# Patient Record
Sex: FEMALE | Race: WHITE | HISPANIC OR LATINO | URBAN - METROPOLITAN AREA
[De-identification: names, ages, dates, MRNs, and addresses within clinical notes are randomized per-mention and may not be internally consistent; named-entity substitution may affect disease eponyms.]

---

## 2023-12-18 ENCOUNTER — HOSPITAL ENCOUNTER (EMERGENCY)
Facility: HOSPITAL | Age: 48
Discharge: HOME | End: 2023-12-18
Attending: STUDENT IN AN ORGANIZED HEALTH CARE EDUCATION/TRAINING PROGRAM
Payer: MEDICAID

## 2023-12-18 VITALS
TEMPERATURE: 99 F | DIASTOLIC BLOOD PRESSURE: 96 MMHG | BODY MASS INDEX: 32.78 KG/M2 | HEART RATE: 82 BPM | SYSTOLIC BLOOD PRESSURE: 171 MMHG | WEIGHT: 185 LBS | HEIGHT: 63 IN | OXYGEN SATURATION: 97 % | RESPIRATION RATE: 18 BRPM

## 2023-12-18 DIAGNOSIS — B08.4 HAND, FOOT AND MOUTH DISEASE: Primary | ICD-10-CM

## 2023-12-18 LAB
GROUP A STREP, MOLECULAR: NEGATIVE
INFLUENZA A, MOLECULAR: NEGATIVE
INFLUENZA B, MOLECULAR: NEGATIVE
SARS-COV-2 RDRP RESP QL NAA+PROBE: NEGATIVE
SPECIMEN SOURCE: NORMAL

## 2023-12-18 PROCEDURE — 99282 EMERGENCY DEPT VISIT SF MDM: CPT | Mod: ER

## 2023-12-18 PROCEDURE — 25000003 PHARM REV CODE 250: Mod: ER | Performed by: STUDENT IN AN ORGANIZED HEALTH CARE EDUCATION/TRAINING PROGRAM

## 2023-12-18 PROCEDURE — U0002 COVID-19 LAB TEST NON-CDC: HCPCS | Mod: ER | Performed by: STUDENT IN AN ORGANIZED HEALTH CARE EDUCATION/TRAINING PROGRAM

## 2023-12-18 PROCEDURE — 87651 STREP A DNA AMP PROBE: CPT | Mod: ER | Performed by: STUDENT IN AN ORGANIZED HEALTH CARE EDUCATION/TRAINING PROGRAM

## 2023-12-18 PROCEDURE — 87502 INFLUENZA DNA AMP PROBE: CPT | Mod: ER | Performed by: STUDENT IN AN ORGANIZED HEALTH CARE EDUCATION/TRAINING PROGRAM

## 2023-12-18 RX ORDER — DIPHENHYDRAMINE HCL 12.5MG/5ML
25 ELIXIR ORAL
Status: COMPLETED | OUTPATIENT
Start: 2023-12-18 | End: 2023-12-18

## 2023-12-18 RX ORDER — LISINOPRIL 10 MG/1
10 TABLET ORAL DAILY
COMMUNITY

## 2023-12-18 RX ORDER — MAG HYDROX/ALUMINUM HYD/SIMETH 200-200-20
5 SUSPENSION, ORAL (FINAL DOSE FORM) ORAL
Status: COMPLETED | OUTPATIENT
Start: 2023-12-18 | End: 2023-12-18

## 2023-12-18 RX ADMIN — DIPHENHYDRAMINE HYDROCHLORIDE 25 MG: 12.5 SOLUTION ORAL at 02:12

## 2023-12-18 RX ADMIN — ALUMINUM HYDROXIDE, MAGNESIUM HYDROXIDE, AND SIMETHICONE 5 ML: 200; 200; 20 SUSPENSION ORAL at 02:12

## 2023-12-18 NOTE — Clinical Note
"Berenice Liumariel Claros was seen and treated in our emergency department on 12/18/2023.  She may return to work on 12/21/2023.       If you have any questions or concerns, please don't hesitate to call.      Clifford Tabor MD"

## 2023-12-20 NOTE — ED PROVIDER NOTES
"ED Provider Note - 12/18/2023    History     Chief Complaint   Patient presents with    Sore Throat     Reports to ED c c/o sore throat and ear pain x 2 days. Denies cough or SOB       HPI     Berenice Claros is a 48 y.o. year old female with past medical and surgical history as seen below, presenting with chief complaint of sore throat.  Associated with rash including the palms soles of feet.  No tropical travel.  No insect or tick exposure.      History reviewed. No pertinent past medical history.  No past surgical history on file.      History reviewed. No pertinent family history.     Social Determinants of Health with Concerns     Tobacco Use: Not on file   Alcohol Use: Not on file   Financial Resource Strain: Not on file   Food Insecurity: Not on file   Transportation Needs: Not on file   Physical Activity: Not on file   Stress: Not on file   Social Connections: Not on file   Housing Stability: Not on file   Depression: Not on file      Review of patient's allergies indicates:   Allergen Reactions    Penicillins Hives       Review of Systems     A full Review of Systems (ROS) was performed and was negative unless otherwise stated in the HPI.      Physical Exam     Vitals:    12/18/23 0136 12/18/23 0151   BP: (!) 171/96    Pulse: 82    Resp: 18    Temp: 98.8 °F (37.1 °C)    TempSrc: Oral    SpO2: 97%    Weight: 83.9 kg (185 lb)    Height:  5' 3" (1.6 m)        Physical Exam    Nursing note and vitals reviewed.  Constitutional: She appears well-developed and well-nourished.   HENT:   Head: Normocephalic and atraumatic.   Right Ear: External ear normal.   Left Ear: External ear normal.   Nose: Nose normal.   Eyes: Conjunctivae and EOM are normal. Pupils are equal, round, and reactive to light.   Neck: No tracheal deviation present.   Normal range of motion.  Cardiovascular:  Normal rate and regular rhythm.           Pulmonary/Chest: No respiratory distress. She has no wheezes.   Musculoskeletal:         " General: No edema. Normal range of motion.      Cervical back: Normal range of motion.     Neurological: She is alert and oriented to person, place, and time. She has normal strength. No cranial nerve deficit or sensory deficit. Gait normal.   Skin: Skin is warm and dry.   Rash including palms and soles of feet most consistent with hand-foot-mouth   Psychiatric: She has a normal mood and affect. Her behavior is normal. Thought content normal.         Lab Results- Independently reviewed by myself      Labs Reviewed   GROUP A STREP, MOLECULAR   INFLUENZA A & B BY MOLECULAR   SARS-COV-2 RNA AMPLIFICATION, QUAL    Narrative:     Is the patient symptomatic?->Yes           Imaging     Imaging Results    None                    ED Course         Procedures         Orders Placed This Encounter    Group A Strep, Molecular    Influenza A & B by Molecular    COVID-19 Rapid Screening    Ambulatory referral/consult to Family Practice    diphenhydrAMINE 12.5 mg/5 mL elixir 25 mg    aluminum-magnesium hydroxide-simethicone 200-200-20 mg/5 mL suspension 5 mL    (Magic mouthwash) 1:1 Benadryl 12.5mg/5ml liq, mylanta, LIDOcaine viscous 2%, nystatin 100,000u, prednisolone 15mg/5mL, distilled water                      Medical Decision Making       The patient's list of active medical problems, social history, medications, and allergies as documented per RN staff has been reviewed.           Medical Decision Making  48-year-old female with symptoms most consistent with hand-foot-mouth disease.  Rash does not have appearance consistent with other tick or mosquito borne illness.  No other concerning exposures.  Differential includes stella mountain spotted fever, ehrlichiosis, viral exanthem, secondary syphilis, but these seem less likely than hand-foot-mouth given history and exam.  Patient appears stable for discharge and outpatient follow up.    Amount and/or Complexity of Data Reviewed  Labs: ordered.     Details: Strep:  Negative    COVID:  Negative   Influenza A and B:  Negative      Risk  OTC drugs.                Discharge Medication List as of 12/18/2023  2:25 AM             ED Prescriptions       Medication Sig Dispense Start Date End Date Auth. Provider    (Magic mouthwash) 1:1 Benadryl 12.5mg/5ml liq, mylanta, LIDOcaine viscous 2%, nystatin 100,000u, prednisolone 15mg/5mL, distilled water Swish and swallow 15 mLs every 4 (four) hours as needed (mouth pain). 480 mL 12/18/2023 -- Clifford Tabor MD              Clinical Impression       Follow-up Information       Follow up With Specialties Details Why Contact Info    Primary care provider of your choice  Schedule an appointment as soon as possible for a visit in 3 days For follow-up on today's visit.     [facility] - Emergency Dept Emergency Medicine Go to  As needed, If symptoms worsen [address] [facility] [id number] [phone]            Referrals:  Orders Placed This Encounter   Procedures    Ambulatory referral/consult to Family Practice     Standing Status:   Future     Standing Expiration Date:   1/18/2025     Referral Priority:   Routine     Referral Type:   Consultation     Referral Reason:   Specialty Services Required     Requested Specialty:   Family Medicine     Number of Visits Requested:   1       Disposition   ED Disposition Condition    Discharge Stable            Diagnosis    ICD-10-CM ICD-9-CM   1. Hand, foot and mouth disease  B08.4 074.3           Clifford Tabor MD        12/20/2023          DISCLAIMER: This note was prepared with [employer] voice recognition transcription software. Garbled syntax, mangled pronouns, and other bizarre constructions may be attributed to that software system.       Clifford Tabor MD  12/20/23 0346

## 2024-05-28 ENCOUNTER — HOSPITAL ENCOUNTER (EMERGENCY)
Facility: HOSPITAL | Age: 49
Discharge: HOME | End: 2024-05-28
Attending: EMERGENCY MEDICINE
Payer: MEDICAID

## 2024-05-28 VITALS
HEART RATE: 71 BPM | OXYGEN SATURATION: 95 % | TEMPERATURE: 98 F | WEIGHT: 212 LBS | HEIGHT: 64 IN | SYSTOLIC BLOOD PRESSURE: 190 MMHG | BODY MASS INDEX: 36.19 KG/M2 | RESPIRATION RATE: 16 BRPM | DIASTOLIC BLOOD PRESSURE: 115 MMHG

## 2024-05-28 DIAGNOSIS — R07.9 CHEST PAIN, UNSPECIFIED TYPE: ICD-10-CM

## 2024-05-28 DIAGNOSIS — R00.2 PALPITATIONS: Primary | ICD-10-CM

## 2024-05-28 LAB
ALBUMIN SERPL BCP-MCNC: 4.7 G/DL (ref 3.5–5.2)
ALP SERPL-CCNC: 99 U/L (ref 38–126)
ALT SERPL W/O P-5'-P-CCNC: 23 U/L (ref 10–44)
ANION GAP SERPL CALC-SCNC: 12 MMOL/L (ref 8–16)
AST SERPL-CCNC: 23 U/L (ref 15–46)
BASOPHILS # BLD AUTO: 0.06 K/UL (ref 0–0.2)
BASOPHILS NFR BLD: 0.7 % (ref 0–1.9)
BILIRUB SERPL-MCNC: 0.7 MG/DL (ref 0.1–1)
CALCIUM SERPL-MCNC: 9.1 MG/DL (ref 8.7–10.5)
CHLORIDE SERPL-SCNC: 102 MMOL/L (ref 95–110)
CO2 SERPL-SCNC: 24 MMOL/L (ref 23–29)
CREAT SERPL-MCNC: 0.38 MG/DL (ref 0.5–1.4)
DIFFERENTIAL METHOD BLD: ABNORMAL
EOSINOPHIL # BLD AUTO: 0.1 K/UL (ref 0–0.5)
EOSINOPHIL NFR BLD: 0.8 % (ref 0–8)
ERYTHROCYTE [DISTWIDTH] IN BLOOD BY AUTOMATED COUNT: 12 % (ref 11.5–14.5)
EST. GFR  (NO RACE VARIABLE): >60 ML/MIN/1.73 M^2
GLUCOSE SERPL-MCNC: 113 MG/DL (ref 70–110)
HCT VFR BLD AUTO: 40.6 % (ref 37–48.5)
HGB BLD-MCNC: 14.4 G/DL (ref 12–16)
IMM GRANULOCYTES # BLD AUTO: 0.04 K/UL (ref 0–0.04)
IMM GRANULOCYTES NFR BLD AUTO: 0.4 % (ref 0–0.5)
LYMPHOCYTES # BLD AUTO: 3.1 K/UL (ref 1–4.8)
LYMPHOCYTES NFR BLD: 34.4 % (ref 18–48)
MAGNESIUM SERPL-MCNC: 1.8 MG/DL (ref 1.6–2.6)
MCH RBC QN AUTO: 32 PG (ref 27–31)
MCHC RBC AUTO-ENTMCNC: 35.5 G/DL (ref 32–36)
MCV RBC AUTO: 90 FL (ref 82–98)
MONOCYTES # BLD AUTO: 0.6 K/UL (ref 0.3–1)
MONOCYTES NFR BLD: 6.7 % (ref 4–15)
NEUTROPHILS # BLD AUTO: 5.1 K/UL (ref 1.8–7.7)
NEUTROPHILS NFR BLD: 57 % (ref 38–73)
NRBC BLD-RTO: 0 /100 WBC
NT-PROBNP SERPL-MCNC: <20 PG/ML (ref 5–450)
PLATELET # BLD AUTO: 328 K/UL (ref 150–450)
PMV BLD AUTO: 10.2 FL (ref 9.2–12.9)
POTASSIUM SERPL-SCNC: 3.3 MMOL/L (ref 3.5–5.1)
PROT SERPL-MCNC: 7.7 G/DL (ref 6–8.4)
RBC # BLD AUTO: 4.5 M/UL (ref 4–5.4)
SODIUM SERPL-SCNC: 138 MMOL/L (ref 136–145)
TROPONIN I SERPL-MCNC: <0.012 NG/ML (ref 0.01–0.03)
TROPONIN I SERPL-MCNC: <0.012 NG/ML (ref 0.01–0.03)
UUN UR-MCNC: 5 MG/DL (ref 7–17)
WBC # BLD AUTO: 8.96 K/UL (ref 3.9–12.7)

## 2024-05-28 PROCEDURE — 83735 ASSAY OF MAGNESIUM: CPT | Mod: ER | Performed by: EMERGENCY MEDICINE

## 2024-05-28 PROCEDURE — 99284 EMERGENCY DEPT VISIT MOD MDM: CPT | Mod: 25,ER

## 2024-05-28 PROCEDURE — 25000003 PHARM REV CODE 250: Mod: ER | Performed by: EMERGENCY MEDICINE

## 2024-05-28 PROCEDURE — 84484 ASSAY OF TROPONIN QUANT: CPT | Mod: 91,ER | Performed by: EMERGENCY MEDICINE

## 2024-05-28 PROCEDURE — 25500020 PHARM REV CODE 255: Mod: ER | Performed by: EMERGENCY MEDICINE

## 2024-05-28 PROCEDURE — 99900035 HC TECH TIME PER 15 MIN (STAT): Mod: ER

## 2024-05-28 PROCEDURE — 63600175 PHARM REV CODE 636 W HCPCS: Mod: ER | Performed by: EMERGENCY MEDICINE

## 2024-05-28 PROCEDURE — 96361 HYDRATE IV INFUSION ADD-ON: CPT | Mod: ER

## 2024-05-28 PROCEDURE — 85025 COMPLETE CBC W/AUTO DIFF WBC: CPT | Mod: ER | Performed by: EMERGENCY MEDICINE

## 2024-05-28 PROCEDURE — 80053 COMPREHEN METABOLIC PANEL: CPT | Mod: ER | Performed by: EMERGENCY MEDICINE

## 2024-05-28 PROCEDURE — 93010 ELECTROCARDIOGRAM REPORT: CPT | Mod: ,,, | Performed by: INTERNAL MEDICINE

## 2024-05-28 PROCEDURE — 93005 ELECTROCARDIOGRAM TRACING: CPT | Mod: ER

## 2024-05-28 PROCEDURE — 83880 ASSAY OF NATRIURETIC PEPTIDE: CPT | Mod: ER | Performed by: EMERGENCY MEDICINE

## 2024-05-28 PROCEDURE — 96375 TX/PRO/DX INJ NEW DRUG ADDON: CPT | Mod: 59,ER

## 2024-05-28 PROCEDURE — 96374 THER/PROPH/DIAG INJ IV PUSH: CPT | Mod: 59,ER

## 2024-05-28 RX ORDER — ASPIRIN 325 MG
325 TABLET ORAL
Status: COMPLETED | OUTPATIENT
Start: 2024-05-28 | End: 2024-05-28

## 2024-05-28 RX ORDER — FAMOTIDINE 20 MG/1
20 TABLET, FILM COATED ORAL 2 TIMES DAILY
Qty: 20 TABLET | Refills: 0 | Status: SHIPPED | OUTPATIENT
Start: 2024-05-28 | End: 2025-05-28

## 2024-05-28 RX ORDER — KETOROLAC TROMETHAMINE 30 MG/ML
15 INJECTION, SOLUTION INTRAMUSCULAR; INTRAVENOUS
Status: COMPLETED | OUTPATIENT
Start: 2024-05-28 | End: 2024-05-28

## 2024-05-28 RX ORDER — PROCHLORPERAZINE EDISYLATE 5 MG/ML
10 INJECTION INTRAMUSCULAR; INTRAVENOUS
Status: COMPLETED | OUTPATIENT
Start: 2024-05-28 | End: 2024-05-28

## 2024-05-28 RX ADMIN — KETOROLAC TROMETHAMINE 15 MG: 30 INJECTION, SOLUTION INTRAMUSCULAR at 04:05

## 2024-05-28 RX ADMIN — SODIUM CHLORIDE 500 ML: 9 INJECTION, SOLUTION INTRAVENOUS at 02:05

## 2024-05-28 RX ADMIN — ASPIRIN 325 MG ORAL TABLET 325 MG: 325 PILL ORAL at 01:05

## 2024-05-28 RX ADMIN — POTASSIUM BICARBONATE 50 MEQ: 978 TABLET, EFFERVESCENT ORAL at 05:05

## 2024-05-28 RX ADMIN — IOHEXOL 100 ML: 350 INJECTION, SOLUTION INTRAVENOUS at 02:05

## 2024-05-28 RX ADMIN — PROCHLORPERAZINE EDISYLATE 10 MG: 5 INJECTION INTRAMUSCULAR; INTRAVENOUS at 04:05

## 2024-05-28 NOTE — ED PROVIDER NOTES
Encounter Date: 5/28/2024       History     Chief Complaint   Patient presents with    Palpitations     PT just got back from cruise and while on cruise, she was experiencing palpitations and elevated heart rate. Pt went see PCP today and was told to come to ER due to abnormal findings on EKG and high blood pressure. PT reports she does have tightness in chest, neck and back. Pt also reports bilateral leg swelling       49-year-old female referred to the emergency department by PCP for evaluation of palpitations, chest pressure.  Patient just recently got off a cruise.  She was feeling some palpitations during the cruise but had an EKG that was apparently reassuring on the cruise.  She called her primary care doctor and had an appointment today.  When she describes her symptoms she was referred to the ER.  Notes some transient shortness of breath while on the cruise but none since.  Notes some chest pressure.  No exertional component noted.  Denies any nausea or vomiting.  Notes some leg swelling as well.  Leg swelling is symmetric.  Denies any cough, congestion, fever.  No other symptoms reported at this time.      Review of patient's allergies indicates:   Allergen Reactions    Penicillins Hives     Past Medical History:   Diagnosis Date    Hypertension      Past Surgical History:   Procedure Laterality Date    HERNIA REPAIR      HYSTERECTOMY       No family history on file.  Social History     Tobacco Use    Smoking status: Never    Smokeless tobacco: Never   Substance Use Topics    Alcohol use: Yes     Comment: occ    Drug use: Yes     Types: Marijuana     Review of Systems   Constitutional:  Negative for chills and fever.   HENT:  Negative for congestion.    Respiratory:  Positive for shortness of breath (Resolved). Negative for cough.    Cardiovascular:  Positive for chest pain.   Gastrointestinal:  Negative for abdominal pain.   Neurological:  Negative for light-headedness and headaches.       Physical Exam      Initial Vitals [05/28/24 1310]   BP Pulse Resp Temp SpO2   (!) 204/115 87 20 98.4 °F (36.9 °C) 98 %      MAP       --         Physical Exam    Nursing note and vitals reviewed.  Constitutional: She appears well-developed and well-nourished. No distress.   HENT:   Head: Normocephalic and atraumatic.   Eyes: Conjunctivae and EOM are normal. Pupils are equal, round, and reactive to light.   Neck: Neck supple. No tracheal deviation present.   Normal range of motion.  Cardiovascular:  Normal rate and intact distal pulses.           Pulmonary/Chest: No respiratory distress.   Musculoskeletal:         General: Edema (trace pitting edema to bilateral lower extremities) present. No tenderness. Normal range of motion.      Cervical back: Normal range of motion and neck supple.     Neurological: She is alert and oriented to person, place, and time. She has normal strength. No cranial nerve deficit. GCS score is 15. GCS eye subscore is 4. GCS verbal subscore is 5. GCS motor subscore is 6.   Skin: Skin is warm and dry.         ED Course   Procedures  Labs Reviewed   CBC W/ AUTO DIFFERENTIAL - Abnormal; Notable for the following components:       Result Value    MCH 32.0 (*)     All other components within normal limits   COMPREHENSIVE METABOLIC PANEL - Abnormal; Notable for the following components:    Potassium 3.3 (*)     Glucose 113 (*)     BUN 5 (*)     Creatinine 0.38 (*)     All other components within normal limits   TROPONIN I   MAGNESIUM   NT-PRO NATRIURETIC PEPTIDE   TROPONIN I     EKG Readings: (Independently Interpreted)   Initial Reading: No STEMI. Previous EKG Date: no prior available for comparison. Rhythm: Normal Sinus Rhythm. Heart Rate: 90. Ectopy: No Ectopy. ST Segments: Normal ST Segments. Axis: Normal.    EKG independently interpreted by me pending Cardiology review       Imaging Results              X-Ray Chest AP Portable (Final result)  Result time 05/28/24 15:42:08      Final result by Enoc  MD Cesar (05/28/24 15:42:08)                   Impression:      No acute finding in the chest.      Electronically signed by: Cesar Pearce  Date:    05/28/2024  Time:    15:42               Narrative:    EXAMINATION:  XR CHEST AP PORTABLE    CLINICAL HISTORY:  Chest Pain;    FINDINGS:  Comparison: None available.    Mediastinal silhouette is within normal limits.  The lungs are clear.  No pneumothorax or pleural effusion.  No acute osseous finding.                                       CTA Chest Non-Coronary (PE Studies) (In process)                   X-Rays:   Independently Interpreted Readings:   Other Readings:   Chest x-ray independently interpreted by me pending radiology review: No acute infiltrate, no pneumothorax, no effusion    Medications   aspirin tablet 325 mg (325 mg Oral Given 5/28/24 1315)   sodium chloride 0.9% bolus 500 mL 500 mL (0 mLs Intravenous Stopped 5/28/24 1557)   prochlorperazine injection Soln 10 mg (10 mg Intravenous Given 5/28/24 1608)   iohexoL (OMNIPAQUE 350) injection 100 mL (100 mLs Intravenous Given 5/28/24 1428)   ketorolac injection 15 mg (15 mg Intravenous Given 5/28/24 1611)   potassium bicarbonate disintegrating tablet 50 mEq (50 mEq Oral Given 5/28/24 1730)     Medical Decision Making   49-year-old female presents to the emergency department complaining chest pain, palpitations, transient shortness breath, leg swelling       Differential: ACS, dissection, pneumonia, pneumothorax, CHF, COPD, anemia, JUDE, dehydration, hypervolemia         Labs and x-ray fairly benign.  Patient initially given aspirin, subsequently given some IV fluid and antiemetics with Toradol.  Potassium repleted orally.  On re-evaluation she reports resolution of symptoms.  I did get a CTA given her leg swelling and recent travel history.  Unfortunately our radiology department as had some issues transmitting the images from the CT scanner to our work stations.  Patient will be handed off to Dr. Quach  for follow-up of pending CTA and likely discharge.  I have placed a referral for outpatient Cardiology follow-up as well.    Problems Addressed:  Chest pain, unspecified type: acute illness or injury  Palpitations: acute illness or injury    Amount and/or Complexity of Data Reviewed  Labs: ordered.     Details:   CBC without leukocytosis, normal H&H; CMP with normal renal and liver function tests, mild hypokalemia; troponin negative x2  Radiology: ordered and independent interpretation performed. Decision-making details documented in ED Course.  ECG/medicine tests: ordered and independent interpretation performed. Decision-making details documented in ED Course.    Risk  OTC drugs.  Prescription drug management.  Parenteral controlled substances.                                      Clinical Impression:  Final diagnoses:  [R00.2] Palpitations  [R07.9] Chest pain, unspecified type (Primary)                 Kartik Lofton MD  05/28/24 8303

## 2024-05-28 NOTE — ED NOTES
PT presents to the ED with C/O midsternal chest pain that radiates to back x 1 week. Pain is constant and described as pressure and tightness. +SOB +palpitations. +diaphoresis. Hypertensive. All other VSS.

## 2024-05-28 NOTE — DISCHARGE INSTRUCTIONS
Please arrange for a follow-up appointment with a cardiologist as well as with your primary care physician for further evaluation and management.  Please return with any new or worsening symptoms.

## 2024-05-29 LAB
OHS QRS DURATION: 88 MS
OHS QTC CALCULATION: 464 MS